# Patient Record
Sex: FEMALE | Race: WHITE | Employment: FULL TIME | URBAN - METROPOLITAN AREA
[De-identification: names, ages, dates, MRNs, and addresses within clinical notes are randomized per-mention and may not be internally consistent; named-entity substitution may affect disease eponyms.]

---

## 2024-06-17 RX ORDER — COVID-19 ANTIGEN TEST
KIT MISCELLANEOUS PRN
COMMUNITY

## 2024-06-18 NOTE — DISCHARGE INSTRUCTIONS
keyboards.  When to call for help  Call 911 anytime you think you may need emergency care. For example, call if:  You have severe trouble breathing. (You can't talk at all.)  You have constant chest pain or pressure.  You are severely dizzy or lightheaded.  You are confused or can't think clearly.  Your face and lips have a blue color.  You pass out (lose consciousness) or are very hard to wake up.  Call your doctor now if you develop symptoms such as:  Shortness of breath.  Fever.  Cough.  If you need to get care, call ahead to the doctor's office for instructions before you go. Make sure you wear a face mask, if you have one, to prevent exposing other people to the virus.  Where can you get the latest information?  The following health organizations are tracking and studying this virus. Their websites contain the most up-to-date information. You'll also learn what to do if you think you may have been exposed to the virus.  U.S. Centers for Disease Control and Prevention (CDC): The CDC provides updated news about the disease and travel advice. The website also tells you how to prevent the spread of infection. www.cdc.gov  World Health Organization (WHO): WHO offers information about the virus outbreaks. WHO also has travel advice. www.who.int  Current as of: April 1, 2020               Content Version: 12.4  © 1698-1493 [employer].   Care instructions adapted under license by your healthcare professional. If you have questions about a medical condition or this instruction, always ask your healthcare professional. [employer] disclaims any warranty or liability for your use of this information.    AT THE COMPLETION OF DISCHARGE INSTRUCTION REVIEW, WE VERIFY:  The discharge information has been reviewed with the patient and caregiver.    Questions have been asked and answered meeting patient and caregiver expectations.  The patient and caregiver verbalized understanding.    Your discharge

## 2024-06-19 ENCOUNTER — ANESTHESIA (OUTPATIENT)
Facility: HOSPITAL | Age: 39
End: 2024-06-19
Payer: COMMERCIAL

## 2024-06-19 ENCOUNTER — HOSPITAL ENCOUNTER (OUTPATIENT)
Facility: HOSPITAL | Age: 39
Setting detail: OUTPATIENT SURGERY
Discharge: HOME | End: 2024-06-19
Attending: ORTHOPAEDIC SURGERY | Admitting: ORTHOPAEDIC SURGERY
Payer: COMMERCIAL

## 2024-06-19 ENCOUNTER — ANESTHESIA EVENT (OUTPATIENT)
Facility: HOSPITAL | Age: 39
End: 2024-06-19
Payer: COMMERCIAL

## 2024-06-19 VITALS
BODY MASS INDEX: 25.74 KG/M2 | TEMPERATURE: 97.8 F | HEIGHT: 67 IN | RESPIRATION RATE: 14 BRPM | HEART RATE: 72 BPM | WEIGHT: 164 LBS | OXYGEN SATURATION: 99 % | DIASTOLIC BLOOD PRESSURE: 67 MMHG | SYSTOLIC BLOOD PRESSURE: 102 MMHG

## 2024-06-19 DIAGNOSIS — G56.02 CARPAL TUNNEL SYNDROME ON LEFT: Primary | ICD-10-CM

## 2024-06-19 LAB — HCG UR QL: NEGATIVE

## 2024-06-19 PROCEDURE — 2580000003 HC RX 258: Performed by: ANESTHESIOLOGY

## 2024-06-19 PROCEDURE — 2500000003 HC RX 250 WO HCPCS: Performed by: NURSE ANESTHETIST, CERTIFIED REGISTERED

## 2024-06-19 PROCEDURE — 2720000010 HC SURG SUPPLY STERILE: Performed by: ORTHOPAEDIC SURGERY

## 2024-06-19 PROCEDURE — 7100000011 HC PHASE II RECOVERY - ADDTL 15 MIN: Performed by: ORTHOPAEDIC SURGERY

## 2024-06-19 PROCEDURE — 6360000002 HC RX W HCPCS: Performed by: NURSE ANESTHETIST, CERTIFIED REGISTERED

## 2024-06-19 PROCEDURE — 3600000002 HC SURGERY LEVEL 2 BASE: Performed by: ORTHOPAEDIC SURGERY

## 2024-06-19 PROCEDURE — 3700000001 HC ADD 15 MINUTES (ANESTHESIA): Performed by: ORTHOPAEDIC SURGERY

## 2024-06-19 PROCEDURE — 6360000002 HC RX W HCPCS: Performed by: ORTHOPAEDIC SURGERY

## 2024-06-19 PROCEDURE — 6360000002 HC RX W HCPCS

## 2024-06-19 PROCEDURE — 3600000012 HC SURGERY LEVEL 2 ADDTL 15MIN: Performed by: ORTHOPAEDIC SURGERY

## 2024-06-19 PROCEDURE — 7100000010 HC PHASE II RECOVERY - FIRST 15 MIN: Performed by: ORTHOPAEDIC SURGERY

## 2024-06-19 PROCEDURE — 81025 URINE PREGNANCY TEST: CPT

## 2024-06-19 PROCEDURE — 2709999900 HC NON-CHARGEABLE SUPPLY: Performed by: ORTHOPAEDIC SURGERY

## 2024-06-19 PROCEDURE — 2580000003 HC RX 258: Performed by: ORTHOPAEDIC SURGERY

## 2024-06-19 PROCEDURE — 6360000002 HC RX W HCPCS: Performed by: ANESTHESIOLOGY

## 2024-06-19 PROCEDURE — 3700000000 HC ANESTHESIA ATTENDED CARE: Performed by: ORTHOPAEDIC SURGERY

## 2024-06-19 PROCEDURE — 64415 NJX AA&/STRD BRCH PLXS IMG: CPT | Performed by: ANESTHESIOLOGY

## 2024-06-19 RX ORDER — ACETAMINOPHEN 325 MG/1
650 TABLET ORAL ONCE
Status: DISCONTINUED | OUTPATIENT
Start: 2024-06-19 | End: 2024-06-19 | Stop reason: HOSPADM

## 2024-06-19 RX ORDER — ONDANSETRON 2 MG/ML
4 INJECTION INTRAMUSCULAR; INTRAVENOUS EVERY 6 HOURS PRN
Status: CANCELLED | OUTPATIENT
Start: 2024-06-19

## 2024-06-19 RX ORDER — MEPERIDINE HYDROCHLORIDE 25 MG/ML
12.5 INJECTION INTRAMUSCULAR; INTRAVENOUS; SUBCUTANEOUS EVERY 5 MIN PRN
Status: DISCONTINUED | OUTPATIENT
Start: 2024-06-19 | End: 2024-06-19 | Stop reason: HOSPADM

## 2024-06-19 RX ORDER — MIDAZOLAM HYDROCHLORIDE 1 MG/ML
INJECTION INTRAMUSCULAR; INTRAVENOUS
Status: COMPLETED
Start: 2024-06-19 | End: 2024-06-19

## 2024-06-19 RX ORDER — ONDANSETRON 2 MG/ML
INJECTION INTRAMUSCULAR; INTRAVENOUS PRN
Status: DISCONTINUED | OUTPATIENT
Start: 2024-06-19 | End: 2024-06-19 | Stop reason: SDUPTHER

## 2024-06-19 RX ORDER — DIPHENHYDRAMINE HYDROCHLORIDE 50 MG/ML
12.5 INJECTION INTRAMUSCULAR; INTRAVENOUS
Status: DISCONTINUED | OUTPATIENT
Start: 2024-06-19 | End: 2024-06-19 | Stop reason: HOSPADM

## 2024-06-19 RX ORDER — FENTANYL CITRATE 50 UG/ML
INJECTION, SOLUTION INTRAMUSCULAR; INTRAVENOUS PRN
Status: DISCONTINUED | OUTPATIENT
Start: 2024-06-19 | End: 2024-06-19 | Stop reason: SDUPTHER

## 2024-06-19 RX ORDER — HYDROCODONE BITARTRATE AND ACETAMINOPHEN 5; 325 MG/1; MG/1
1 TABLET ORAL EVERY 6 HOURS PRN
Qty: 15 TABLET | Refills: 0
Start: 2024-06-19 | End: 2024-06-22

## 2024-06-19 RX ORDER — NALOXONE HYDROCHLORIDE 0.4 MG/ML
INJECTION, SOLUTION INTRAMUSCULAR; INTRAVENOUS; SUBCUTANEOUS PRN
Status: DISCONTINUED | OUTPATIENT
Start: 2024-06-19 | End: 2024-06-19 | Stop reason: HOSPADM

## 2024-06-19 RX ORDER — ROPIVACAINE HYDROCHLORIDE 5 MG/ML
INJECTION, SOLUTION EPIDURAL; INFILTRATION; PERINEURAL PRN
Status: DISCONTINUED | OUTPATIENT
Start: 2024-06-19 | End: 2024-06-19 | Stop reason: SDUPTHER

## 2024-06-19 RX ORDER — OXYCODONE HYDROCHLORIDE 5 MG/1
5 TABLET ORAL
Status: DISCONTINUED | OUTPATIENT
Start: 2024-06-19 | End: 2024-06-19 | Stop reason: HOSPADM

## 2024-06-19 RX ORDER — MIDAZOLAM HYDROCHLORIDE 1 MG/ML
INJECTION INTRAMUSCULAR; INTRAVENOUS PRN
Status: DISCONTINUED | OUTPATIENT
Start: 2024-06-19 | End: 2024-06-19 | Stop reason: SDUPTHER

## 2024-06-19 RX ORDER — DEXMEDETOMIDINE HYDROCHLORIDE 100 UG/ML
INJECTION, SOLUTION INTRAVENOUS PRN
Status: DISCONTINUED | OUTPATIENT
Start: 2024-06-19 | End: 2024-06-19 | Stop reason: SDUPTHER

## 2024-06-19 RX ORDER — SODIUM CHLORIDE, SODIUM LACTATE, POTASSIUM CHLORIDE, CALCIUM CHLORIDE 600; 310; 30; 20 MG/100ML; MG/100ML; MG/100ML; MG/100ML
INJECTION, SOLUTION INTRAVENOUS CONTINUOUS
Status: DISCONTINUED | OUTPATIENT
Start: 2024-06-19 | End: 2024-06-19 | Stop reason: HOSPADM

## 2024-06-19 RX ORDER — ALBUTEROL SULFATE 2.5 MG/3ML
2.5 SOLUTION RESPIRATORY (INHALATION)
Status: DISCONTINUED | OUTPATIENT
Start: 2024-06-19 | End: 2024-06-19 | Stop reason: HOSPADM

## 2024-06-19 RX ORDER — IPRATROPIUM BROMIDE AND ALBUTEROL SULFATE 2.5; .5 MG/3ML; MG/3ML
1 SOLUTION RESPIRATORY (INHALATION)
Status: DISCONTINUED | OUTPATIENT
Start: 2024-06-19 | End: 2024-06-19 | Stop reason: HOSPADM

## 2024-06-19 RX ORDER — PROPOFOL 10 MG/ML
INJECTION, EMULSION INTRAVENOUS CONTINUOUS PRN
Status: DISCONTINUED | OUTPATIENT
Start: 2024-06-19 | End: 2024-06-19 | Stop reason: SDUPTHER

## 2024-06-19 RX ORDER — ONDANSETRON 2 MG/ML
INJECTION INTRAMUSCULAR; INTRAVENOUS
Status: COMPLETED
Start: 2024-06-19 | End: 2024-06-19

## 2024-06-19 RX ORDER — LABETALOL HYDROCHLORIDE 5 MG/ML
10 INJECTION, SOLUTION INTRAVENOUS
Status: DISCONTINUED | OUTPATIENT
Start: 2024-06-19 | End: 2024-06-19 | Stop reason: HOSPADM

## 2024-06-19 RX ORDER — HYDROMORPHONE HYDROCHLORIDE 1 MG/ML
1 INJECTION, SOLUTION INTRAMUSCULAR; INTRAVENOUS; SUBCUTANEOUS EVERY 5 MIN PRN
Status: DISCONTINUED | OUTPATIENT
Start: 2024-06-19 | End: 2024-06-19 | Stop reason: HOSPADM

## 2024-06-19 RX ORDER — DROPERIDOL 2.5 MG/ML
0.62 INJECTION, SOLUTION INTRAMUSCULAR; INTRAVENOUS
Status: DISCONTINUED | OUTPATIENT
Start: 2024-06-19 | End: 2024-06-19 | Stop reason: HOSPADM

## 2024-06-19 RX ORDER — LIDOCAINE HYDROCHLORIDE 10 MG/ML
1 INJECTION, SOLUTION EPIDURAL; INFILTRATION; INTRACAUDAL; PERINEURAL
Status: DISCONTINUED | OUTPATIENT
Start: 2024-06-19 | End: 2024-06-19 | Stop reason: HOSPADM

## 2024-06-19 RX ADMIN — SODIUM CHLORIDE, POTASSIUM CHLORIDE, SODIUM LACTATE AND CALCIUM CHLORIDE: 600; 310; 30; 20 INJECTION, SOLUTION INTRAVENOUS at 14:40

## 2024-06-19 RX ADMIN — WATER 2000 MG: 1 INJECTION INTRAMUSCULAR; INTRAVENOUS; SUBCUTANEOUS at 14:46

## 2024-06-19 RX ADMIN — DEXMEDETOMIDINE 4 MCG: 100 INJECTION, SOLUTION INTRAVENOUS at 14:42

## 2024-06-19 RX ADMIN — ROPIVACAINE HYDROCHLORIDE 15 ML: 5 INJECTION, SOLUTION EPIDURAL; INFILTRATION; PERINEURAL at 12:59

## 2024-06-19 RX ADMIN — FENTANYL CITRATE 100 MCG: 50 INJECTION, SOLUTION INTRAMUSCULAR; INTRAVENOUS at 12:51

## 2024-06-19 RX ADMIN — MIDAZOLAM HYDROCHLORIDE 2 MG: 1 INJECTION, SOLUTION INTRAMUSCULAR; INTRAVENOUS at 14:40

## 2024-06-19 RX ADMIN — PROPOFOL 50 MG: 10 INJECTION, EMULSION INTRAVENOUS at 14:46

## 2024-06-19 RX ADMIN — MEPIVACAINE HYDROCHLORIDE 15 ML: 15 INJECTION, SOLUTION EPIDURAL; INFILTRATION at 12:59

## 2024-06-19 RX ADMIN — PROPOFOL 150 MCG/KG/MIN: 10 INJECTION, EMULSION INTRAVENOUS at 14:45

## 2024-06-19 RX ADMIN — MIDAZOLAM HYDROCHLORIDE 2 MG: 1 INJECTION, SOLUTION INTRAMUSCULAR; INTRAVENOUS at 12:51

## 2024-06-19 RX ADMIN — ONDANSETRON 4 MG: 2 INJECTION INTRAMUSCULAR; INTRAVENOUS at 13:52

## 2024-06-19 RX ADMIN — SODIUM CHLORIDE, POTASSIUM CHLORIDE, SODIUM LACTATE AND CALCIUM CHLORIDE: 600; 310; 30; 20 INJECTION, SOLUTION INTRAVENOUS at 14:41

## 2024-06-19 RX ADMIN — DEXMEDETOMIDINE 4 MCG: 100 INJECTION, SOLUTION INTRAVENOUS at 14:40

## 2024-06-19 RX ADMIN — ONDANSETRON 4 MG: 2 INJECTION INTRAMUSCULAR; INTRAVENOUS at 14:41

## 2024-06-19 ASSESSMENT — PAIN - FUNCTIONAL ASSESSMENT: PAIN_FUNCTIONAL_ASSESSMENT: NONE - DENIES PAIN

## 2024-06-19 ASSESSMENT — PAIN SCALES - GENERAL
PAINLEVEL_OUTOF10: 0
PAINLEVEL_OUTOF10: 0

## 2024-06-19 NOTE — ANESTHESIA PROCEDURE NOTES
Peripheral Block    Patient location during procedure: pre-op  Reason for block: procedure for pain, post-op pain management, primary anesthetic and at surgeon's request  Start time: 6/19/2024 12:51 PM  End time: 6/19/2024 12:59 PM  Staffing  Performed: anesthesiologist   Anesthesiologist: Marcus Sanchez MD  Performed by: Marcus Sanchez MD  Authorized by: Marcus Sanchez MD    Preanesthetic Checklist  Completed: patient identified, IV checked, site marked, risks and benefits discussed, surgical/procedural consents, pre-op evaluation, timeout performed, anesthesia consent given, oxygen available and monitors applied/VS acknowledged  Peripheral Block   Patient position: supine  Prep: ChloraPrep  Provider prep: mask and sterile gloves  Patient monitoring: cardiac monitor, continuous pulse ox, continuous capnometry, frequent blood pressure checks, IV access, oxygen and responsive to questions  Block type: Brachial plexus  Supraclavicular  Laterality: left  Injection technique: single-shot  Guidance: ultrasound guided    Needle   Needle type: Other   Needle gauge: 22 G  Needle localization: ultrasound guidance  Needle length: 5 cmOther needle type: STIMUPLEX  Assessment   Injection assessment: negative aspiration for heme, no paresthesia on injection, local visualized surrounding nerve on ultrasound and no intravascular symptoms  Hemodynamics: stable  Outcomes: patient tolerated procedure well    Additional Notes  Ana Luisa FRANKLIN witnessed timeout and block written on correct side.

## 2024-06-19 NOTE — ANESTHESIA PRE PROCEDURE
Department of Anesthesiology  Preprocedure Note       Name:  Isela Tyler   Age:  38 y.o.  :  1985                                          MRN:  [id number]         Date:  2024      Surgeon: Surgeon(s):  Troy Fagan MD    Procedure: Procedure(s):  LEFT ENDOSCOPIC CARPAL TUNNEL RELEASE (REGIONAL BLOCK WITH MAC)    Medications prior to admission:   Prior to Admission medications    Medication Sig Start Date End Date Taking? Authorizing Provider   Naproxen Sodium (ALEVE) 220 MG CAPS Take by mouth as needed for Pain   Yes ProviderChris MD   aspirin-acetaminophen-caffeine (EXCEDRIN MIGRAINE) 250-250-65 MG per tablet Take 1 tablet by mouth every 6 hours as needed for Headaches   Yes ProviderChris MD       Current medications:    No current facility-administered medications for this encounter.     Current Outpatient Medications   Medication Sig Dispense Refill   • Naproxen Sodium (ALEVE) 220 MG CAPS Take by mouth as needed for Pain     • aspirin-acetaminophen-caffeine (EXCEDRIN MIGRAINE) 250-250-65 MG per tablet Take 1 tablet by mouth every 6 hours as needed for Headaches         Allergies:    Allergies   Allergen Reactions   • Cantaloupe (Diagnostic) Angioedema     Throat itching       Problem List:  There is no problem list on file for this patient.      Past Medical History:        Diagnosis Date   • Headache        Past Surgical History:        Procedure Laterality Date   • CARPAL TUNNEL RELEASE Right        Social History:    Social History     Tobacco Use   • Smoking status: Never   • Smokeless tobacco: Never   Substance Use Topics   • Alcohol use: Yes     Alcohol/week: 2.0 standard drinks of alcohol     Types: 2 Drinks containing 0.5 oz of alcohol per week                                Counseling given: Not Answered      Vital Signs (Current):   Vitals:    24 0833   Weight: 74.4 kg (164 lb)   Height: 1.689 m (5' 6.5\")                                              BP

## 2024-06-19 NOTE — ANESTHESIA POSTPROCEDURE EVALUATION
Department of Anesthesiology  Postprocedure Note    Patient: Isela Tyler  MRN: [id number]  YOB: 1985  Date of evaluation: 6/19/2024    Procedure Summary       Date: 06/19/24 Room / Location: [facility] ASU OR [address] AMBULATORY OR    Anesthesia Start: 1440 Anesthesia Stop: 1511    Procedure: LEFT ENDOSCOPIC CARPAL TUNNEL RELEASE (REGIONAL BLOCK WITH MAC) (Left: Wrist) Diagnosis:       Carpal tunnel syndrome of left wrist      (Carpal tunnel syndrome of left wrist [G56.02])    Surgeons: Troy Fagan MD Responsible Provider: Marcus Sanchez MD    Anesthesia Type: MAC, Regional ASA Status: 2            Anesthesia Type: MAC, Regional    Leonora Phase I: Leonora Score: 10    Leonora Phase II: Leonora Score: 10    Anesthesia Post Evaluation    Patient location during evaluation: PACU  Patient participation: complete - patient participated  Level of consciousness: awake  Pain score: 0  Airway patency: patent  Nausea & Vomiting: no nausea and no vomiting  Cardiovascular status: blood pressure returned to baseline  Respiratory status: acceptable  Hydration status: euvolemic  Multimodal analgesia pain management approach  Pain management: adequate    No notable events documented.

## 2024-06-19 NOTE — PERIOP NOTE
PACU-IN REPORT FROM ANESTHESIA    Verbal report received from   Lorrie   [] MD/-Anesthesiologist    [x] CRNA   [] with student    CHOICE ANESTHESIA:  [] GENERAL  [] TIVA  [x] MAC  [] LOCAL  [x] REGIONAL  [] SPINAL   [] EPIDURAL   **Note the anesthesia record for medications given intraoperatively.**           [] E.R.A.S. PROTOCOL    SURGICAL PROCEDURE: Procedure(s) (LRB):  LEFT ENDOSCOPIC CARPAL TUNNEL RELEASE (REGIONAL BLOCK WITH MAC) (Left)     SURGEON: Troy Faagn MD.    Brief Initial Visual Assessment:    Patient Age: [] Infant(1-12mo)       []Pediatric(1-13yrs)    [] Adolescent(13-18yrs)     [x] Adult(18-65yrs)      []Geriatric Adult(>65yrs).                   Patient    [x] Alert           [x]Calm & Cooperative      [] Anxious/Restless      [] Combative  Appearance:  [x] Drowsy      [] Confused/Disoriented     [] Sedated      [] Unresponsive     Oriented x  3            Airway:     [x] Patent          [] \"Difficult Airway\" report by Anesthesia                        [] Obstructs easily/Obstructed on arrival          [] Manual stimulation and/or airway assistance necessary                         [] Airway improved with head/airway repositioning                       Airway Adjuncts Present: [] Oral Airway    [] Nasal Trumpet    [] ETT    [] LMA            Respiratory  [x] Even   [] Labored   [] Shallow   [] Tachypnea (>28 RR/min)  [] Bradypnea (<10 RR/min)  Pattern:    [x] Non-Labored  [] VENT and/or respiratory assistance     being provided.        Skin:     [x] Pink [] Dusky    [] Pale         [x] Warm     [] Hot [] Cool       [] Cold    [x]Dry     [] Moist [] Diaphoretic     Membranes:  [x] Pink    [] Pale        [x] Moist [] Dry     [] Crusty     Pain:   [x] No Acute Discomfort.    0  /10 Scale [x] Verbal Numeric / Visual   [] Moderate Discomfort.      [] V.A.S.    [] Acute Discomfort.                 [] A.N.V.    [] Chronic-Issue Related Discomfort.   [] F.L.A.C.C.   Note E-MAR for 
POST ANESTHESIA CARE    DISCHARGE / TRANSFER NOTE  Isela Tyler was:    [x] discharged        via   [x] Wheelchair          to [x] Private Vehicle     [] transferred    [] Carried    [] Taxi / Vehicle \"for Hire\"  [] Walk out   [] Ambulance / Medical Transportation   [] Stretcher   [] Hospital room _**_           [] Bed      Patient was escorted by:      [x] Nurse   [] Volunteer  [] Transporter / Technician  [] Parent      [] Spouse / Family /      Patient verbalized     [x] appreciation and was very pleased with care received   [] frustration with care received       throughout their stay.    Patient was discharged in     [x] pleasant mood  [] sad mood  [] mad mood .     Pain at discharge/transfer was      0  /10.    Discharge, medication and follow-up instructions were verbalized as understood prior to discharge  (if applicable for same-day procedures being discharged.)    All personal belongings have been returned to patient, and patient/family verbally confirm receiving belongings as all present.      
mar)  Follow Chlorhexidine Care Fusion body wash instructions provided to you during PAT appointment. Begin 3 days prior to surgery.  Do not shave or trim anywhere 24 hours before surgery  Wear your hair loose or down; no pony-tails, buns, or metal hair clips  Wear loose, comfortable, clean clothes  Wear glasses instead of contacts. Bring a case to keep your glasses safe.  Leave money, valuables, and jewelry, including body piercings, at home  If you were given an Incentive Spirometer, bring it on day of surgery.  If you use inhalers or CPAP machine, bring it with you the day of surgery.     Going Home - or Spending the Night SAME-DAY SURGERY: You must have a responsible adult drive you home and stay with you 24 hours after surgery. You may not drive for 24 hours after surgery.  ADMITS: If your doctor is keeping you in the hospital after surgery, leave personal belongings/luggage in your car until you have a hospital room number.    Hospital discharge time is 12 noon  Drivers must be here before 12 noon unless you are told differently   Special Instructions Free  parking available from 7 AM until 5 PM.       Follow all instructions so your surgery won’t be cancelled.  Please, be on time.                    If a situation occurs and you are delayed the day of surgery, call [phone].    If your physical condition changes (like a fever, cold, flu, etc.) call your surgeon.    Home medication(s) reviewed and verified via PHONE LIST and VERBAL   during PAT appointment.    The patient was contacted by PHONE   The patient verbalizes understanding of all instructions and DOES NOT need reinforcement.

## 2024-06-20 NOTE — OP NOTE
[facility]          [address] [place]  [address]                            OPERATIVE REPORT      PATIENT NAME: MANDY YIP                 : 1985  MED REC NO: [id number]                       ROOM: [facility]  ACCOUNT NO: [id number]                       ADMIT DATE: 2024  PROVIDER: Troy Calle MD    DATE OF SERVICE:  2024    PREOPERATIVE DIAGNOSES:  Left carpal tunnel syndrome.    POSTOPERATIVE DIAGNOSES:  Left carpal tunnel syndrome.    PROCEDURES PERFORMED:  Left endoscopic carpal tunnel release.    SURGEON:  Troy Calle MD    ASSISTANT:  Mounika Griffin.    ANESTHESIA:  Axillary block.    ESTIMATED BLOOD LOSS:  Zero.    SPECIMENS REMOVED:  ***    INTRAOPERATIVE FINDINGS:  ***     COMPLICATIONS:  None.    IMPLANTS:  ***    INDICATIONS:  The patient is a 38-year-old female with a history of numbness, tingling, and pain in her left hand with nerve testing revealing carpal tunnel syndrome.  She was counseled regarding surgical and nonsurgical options and elected to proceed with the above procedure.    DESCRIPTION OF PROCEDURE:  The patient was taken to the operating room, placed supine on the operating table.  Following the administration of axillary block anesthetic, the left arm was prepped and draped in sterile fashion with Hibiclens and alcohol.  Tourniquet was applied to the left proximal arm.  The arm was exsanguinated with an Esmarch bandage and the tourniquet inflated to 250 mmHg.  An incision was made in the distal forearm proximal to the volar wrist crease volarly.  Subcutaneous dissection was carried down and the palmaris longus tendon retracted.  Distal forearm fascia was released exposing the median nerve.  Sequential dilators were then placed along the median nerve from proximal to distal.  The endoscope was then placed in the space created by the dilators.  Endoscopic visualization of the median nerve, flexor

## 2025-06-30 ENCOUNTER — OFFICE VISIT (OUTPATIENT)
Dept: PRIMARY CARE CLINIC | Facility: CLINIC | Age: 40
End: 2025-06-30
Payer: COMMERCIAL

## 2025-06-30 VITALS
HEIGHT: 66 IN | RESPIRATION RATE: 14 BRPM | HEART RATE: 79 BPM | OXYGEN SATURATION: 100 % | DIASTOLIC BLOOD PRESSURE: 72 MMHG | TEMPERATURE: 97.8 F | SYSTOLIC BLOOD PRESSURE: 110 MMHG | BODY MASS INDEX: 25.62 KG/M2 | WEIGHT: 159.4 LBS

## 2025-06-30 DIAGNOSIS — Z11.59 NEED FOR HEPATITIS B SCREENING TEST: ICD-10-CM

## 2025-06-30 DIAGNOSIS — R73.02 IGT (IMPAIRED GLUCOSE TOLERANCE): ICD-10-CM

## 2025-06-30 DIAGNOSIS — Z00.00 ANNUAL PHYSICAL EXAM: Primary | ICD-10-CM

## 2025-06-30 DIAGNOSIS — F33.1 MODERATE EPISODE OF RECURRENT MAJOR DEPRESSIVE DISORDER (HCC): ICD-10-CM

## 2025-06-30 DIAGNOSIS — Z11.59 NEED FOR HEPATITIS C SCREENING TEST: ICD-10-CM

## 2025-06-30 DIAGNOSIS — Z12.4 ENCOUNTER FOR PAPANICOLAOU SMEAR FOR CERVICAL CANCER SCREENING: ICD-10-CM

## 2025-06-30 DIAGNOSIS — Z01.84 IMMUNITY TO VARICELLA DETERMINED BY SEROLOGIC TEST: ICD-10-CM

## 2025-06-30 DIAGNOSIS — E55.9 HYPOVITAMINOSIS D: ICD-10-CM

## 2025-06-30 DIAGNOSIS — Z12.31 ENCOUNTER FOR SCREENING MAMMOGRAM FOR MALIGNANT NEOPLASM OF BREAST: ICD-10-CM

## 2025-06-30 DIAGNOSIS — Z11.4 SCREENING FOR HIV WITHOUT PRESENCE OF RISK FACTORS: ICD-10-CM

## 2025-06-30 PROCEDURE — 99386 PREV VISIT NEW AGE 40-64: CPT | Performed by: NURSE PRACTITIONER

## 2025-06-30 PROCEDURE — 99204 OFFICE O/P NEW MOD 45 MIN: CPT | Performed by: NURSE PRACTITIONER

## 2025-06-30 RX ORDER — SERTRALINE HYDROCHLORIDE 25 MG/1
25 TABLET, FILM COATED ORAL DAILY
Qty: 30 TABLET | Refills: 1 | Status: SHIPPED | OUTPATIENT
Start: 2025-06-30

## 2025-06-30 SDOH — ECONOMIC STABILITY: FOOD INSECURITY: WITHIN THE PAST 12 MONTHS, THE FOOD YOU BOUGHT JUST DIDN'T LAST AND YOU DIDN'T HAVE MONEY TO GET MORE.: NEVER TRUE

## 2025-06-30 SDOH — ECONOMIC STABILITY: FOOD INSECURITY: WITHIN THE PAST 12 MONTHS, YOU WORRIED THAT YOUR FOOD WOULD RUN OUT BEFORE YOU GOT MONEY TO BUY MORE.: NEVER TRUE

## 2025-06-30 ASSESSMENT — ENCOUNTER SYMPTOMS
COLOR CHANGE: 0
CONSTIPATION: 0
SORE THROAT: 0
RHINORRHEA: 0
SHORTNESS OF BREATH: 0
DIARRHEA: 0
ABDOMINAL PAIN: 0
EYE DISCHARGE: 0
BACK PAIN: 0
COUGH: 0
CHEST TIGHTNESS: 0

## 2025-06-30 ASSESSMENT — PATIENT HEALTH QUESTIONNAIRE - PHQ9
2. FEELING DOWN, DEPRESSED OR HOPELESS: SEVERAL DAYS
1. LITTLE INTEREST OR PLEASURE IN DOING THINGS: SEVERAL DAYS
SUM OF ALL RESPONSES TO PHQ QUESTIONS 1-9: 2

## 2025-06-30 NOTE — PROGRESS NOTES
Have you been to the ER, urgent care clinic since your last visit?  Hospitalized since your last visit?   YES - When: approximately 4 months ago.  Where and Why: urgent care.    Have you seen or consulted any other health care providers outside our system since your last visit?   YES - When: approximately 4 months ago.  Where and Why: unknow went for headaches and sinus infection.    Have you had a mammogram?”   NO    No breast cancer screening on file      “Have you had a pap smear?”    YES - Where: 5 years ago     No cervical cancer screening on file
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
## 2025-07-07 DIAGNOSIS — Z01.84 IMMUNITY TO VARICELLA DETERMINED BY SEROLOGIC TEST: ICD-10-CM

## 2025-07-07 DIAGNOSIS — Z11.59 NEED FOR HEPATITIS C SCREENING TEST: ICD-10-CM

## 2025-07-07 DIAGNOSIS — E55.9 HYPOVITAMINOSIS D: ICD-10-CM

## 2025-07-07 DIAGNOSIS — Z11.4 SCREENING FOR HIV WITHOUT PRESENCE OF RISK FACTORS: ICD-10-CM

## 2025-07-07 DIAGNOSIS — Z00.00 ANNUAL PHYSICAL EXAM: ICD-10-CM

## 2025-07-07 DIAGNOSIS — Z11.59 NEED FOR HEPATITIS B SCREENING TEST: ICD-10-CM

## 2025-07-07 DIAGNOSIS — R73.02 IGT (IMPAIRED GLUCOSE TOLERANCE): ICD-10-CM

## 2025-07-08 ENCOUNTER — RESULTS FOLLOW-UP (OUTPATIENT)
Dept: PRIMARY CARE CLINIC | Facility: CLINIC | Age: 40
End: 2025-07-08

## 2025-07-08 LAB
25(OH)D3 SERPL-MCNC: 39.1 NG/ML (ref 30–100)
ALBUMIN SERPL-MCNC: 4.1 G/DL (ref 3.5–5)
ALBUMIN/GLOB SERPL: 1.4 (ref 1.1–2.2)
ALP SERPL-CCNC: 53 U/L (ref 45–117)
ALT SERPL-CCNC: 14 U/L (ref 12–78)
ANION GAP SERPL CALC-SCNC: 6 MMOL/L (ref 2–12)
AST SERPL-CCNC: 12 U/L (ref 15–37)
BASOPHILS # BLD: 0.01 K/UL (ref 0–0.1)
BASOPHILS NFR BLD: 0.3 % (ref 0–1)
BILIRUB SERPL-MCNC: 1 MG/DL (ref 0.2–1)
BUN SERPL-MCNC: 13 MG/DL (ref 6–20)
BUN/CREAT SERPL: 15 (ref 12–20)
CALCIUM SERPL-MCNC: 9.5 MG/DL (ref 8.5–10.1)
CHLORIDE SERPL-SCNC: 106 MMOL/L (ref 97–108)
CHOLEST SERPL-MCNC: 157 MG/DL
CO2 SERPL-SCNC: 26 MMOL/L (ref 21–32)
CREAT SERPL-MCNC: 0.87 MG/DL (ref 0.55–1.02)
DIFFERENTIAL METHOD BLD: NORMAL
EOSINOPHIL # BLD: 0.06 K/UL (ref 0–0.4)
EOSINOPHIL NFR BLD: 1.5 % (ref 0–7)
ERYTHROCYTE [DISTWIDTH] IN BLOOD BY AUTOMATED COUNT: 12.6 % (ref 11.5–14.5)
EST. AVERAGE GLUCOSE BLD GHB EST-MCNC: 91 MG/DL
GLOBULIN SER CALC-MCNC: 2.9 G/DL (ref 2–4)
GLUCOSE SERPL-MCNC: 91 MG/DL (ref 65–100)
HBA1C MFR BLD: 4.8 % (ref 4–5.6)
HBV SURFACE AB SER QL: REACTIVE
HBV SURFACE AB SER-ACNC: 91.93 MIU/ML
HCT VFR BLD AUTO: 42.4 % (ref 35–47)
HCV AB SER IA-ACNC: 0.03 INDEX
HCV AB SERPL QL IA: NONREACTIVE
HDLC SERPL-MCNC: 64 MG/DL
HDLC SERPL: 2.5 (ref 0–5)
HGB BLD-MCNC: 13.2 G/DL (ref 11.5–16)
HIV 1+2 AB+HIV1 P24 AG SERPL QL IA: NONREACTIVE
HIV 1/2 RESULT COMMENT: NORMAL
IMM GRANULOCYTES # BLD AUTO: 0.01 K/UL (ref 0–0.04)
IMM GRANULOCYTES NFR BLD AUTO: 0.3 % (ref 0–0.5)
LDLC SERPL CALC-MCNC: 86 MG/DL (ref 0–100)
LYMPHOCYTES # BLD: 1.31 K/UL (ref 0.8–3.5)
LYMPHOCYTES NFR BLD: 32.9 % (ref 12–49)
MCH RBC QN AUTO: 28.2 PG (ref 26–34)
MCHC RBC AUTO-ENTMCNC: 31.1 G/DL (ref 30–36.5)
MCV RBC AUTO: 90.6 FL (ref 80–99)
MONOCYTES # BLD: 0.35 K/UL (ref 0–1)
MONOCYTES NFR BLD: 8.8 % (ref 5–13)
NEUTS SEG # BLD: 2.24 K/UL (ref 1.8–8)
NEUTS SEG NFR BLD: 56.2 % (ref 32–75)
NRBC # BLD: 0 K/UL (ref 0–0.01)
NRBC BLD-RTO: 0 PER 100 WBC
PLATELET # BLD AUTO: 269 K/UL (ref 150–400)
PMV BLD AUTO: 11.8 FL (ref 8.9–12.9)
POTASSIUM SERPL-SCNC: 4.3 MMOL/L (ref 3.5–5.1)
PROT SERPL-MCNC: 7 G/DL (ref 6.4–8.2)
RBC # BLD AUTO: 4.68 M/UL (ref 3.8–5.2)
SODIUM SERPL-SCNC: 138 MMOL/L (ref 136–145)
TRIGL SERPL-MCNC: 35 MG/DL
TSH SERPL DL<=0.05 MIU/L-ACNC: 1.1 UIU/ML (ref 0.36–3.74)
VLDLC SERPL CALC-MCNC: 7 MG/DL
WBC # BLD AUTO: 4 K/UL (ref 3.6–11)

## 2025-07-09 LAB — VZV IGG SER IA-ACNC: REACTIVE

## (undated) DEVICE — DRESSING PETRO W3XL3IN OIL EMUL N ADH GZ KNIT IMPREG CELOS

## (undated) DEVICE — GLOVE ORANGE PI 7   MSG9070

## (undated) DEVICE — STANDARD (U) BLADE ASSEMBLY 6PK: Brand: MICROAIRE®

## (undated) DEVICE — STRIP,CLOSURE,WOUND,MEDI-STRIP,1/2X4: Brand: MEDLINE

## (undated) DEVICE — SUTURE PROL SZ 5-0 L18IN NONABSORBABLE BLU L13MM P-3 3/8 8698G

## (undated) DEVICE — BANDAGE,GAUZE,BULKEE II,4.5"X4.1YD,STRL: Brand: MEDLINE

## (undated) DEVICE — SOLUTION IRRIG 500ML 0.9% SOD CHLO USP POUR PLAS BTL

## (undated) DEVICE — ZIMMER® STERILE DISPOSABLE TOURNIQUET CUFF WITH PROTECTIVE SLEEVE AND PLC, DUAL PORT, SINGLE BLADDER, 18 IN. (46 CM)

## (undated) DEVICE — GLOVE SURG SZ 6 THK91MIL LTX FREE SYN POLYISOPRENE ANTI

## (undated) DEVICE — BANDAGE COMPR W3INXL5YD TAN POLY COHESIVE CARING SGL

## (undated) DEVICE — MASTISOL ADHESIVE LIQ 2/3ML

## (undated) DEVICE — HAND-SFMCASU: Brand: MEDLINE INDUSTRIES, INC.

## (undated) DEVICE — KIT,ANTI FOG,W/SPONGE & FLUID,SOFT PACK: Brand: MEDLINE

## (undated) DEVICE — GAUZE,SPONGE,FLUFF,6"X6.75",STRL,5/TRAY: Brand: MEDLINE